# Patient Record
Sex: MALE | Race: WHITE | Employment: FULL TIME | URBAN - METROPOLITAN AREA
[De-identification: names, ages, dates, MRNs, and addresses within clinical notes are randomized per-mention and may not be internally consistent; named-entity substitution may affect disease eponyms.]

---

## 2018-10-03 ENCOUNTER — TELEPHONE (OUTPATIENT)
Dept: ENDOCRINOLOGY | Age: 48
End: 2018-10-03

## 2018-10-03 ENCOUNTER — OFFICE VISIT (OUTPATIENT)
Dept: ENDOCRINOLOGY | Age: 48
End: 2018-10-03
Payer: COMMERCIAL

## 2018-10-03 VITALS
HEART RATE: 89 BPM | DIASTOLIC BLOOD PRESSURE: 68 MMHG | WEIGHT: 186.6 LBS | OXYGEN SATURATION: 99 % | BODY MASS INDEX: 26.71 KG/M2 | HEIGHT: 70 IN | SYSTOLIC BLOOD PRESSURE: 114 MMHG

## 2018-10-03 DIAGNOSIS — E23.0 HYPOPITUITARISM (HCC): Primary | ICD-10-CM

## 2018-10-03 DIAGNOSIS — E29.1 HYPOGONADISM MALE: ICD-10-CM

## 2018-10-03 DIAGNOSIS — E04.1 THYROID NODULE: ICD-10-CM

## 2018-10-03 PROCEDURE — 99215 OFFICE O/P EST HI 40 MIN: CPT | Performed by: INTERNAL MEDICINE

## 2018-10-03 RX ORDER — LISINOPRIL 5 MG/1
1 TABLET ORAL DAILY
Refills: 3 | COMMUNITY
Start: 2018-08-17

## 2018-10-03 RX ORDER — TESTOSTERONE CYPIONATE 200 MG/ML
INJECTION INTRAMUSCULAR
Qty: 10 ML | Refills: 1 | Status: SHIPPED | OUTPATIENT
Start: 2018-10-03 | End: 2019-01-04 | Stop reason: SDUPTHER

## 2018-10-03 RX ORDER — VENLAFAXINE 75 MG/1
75 TABLET ORAL DAILY
COMMUNITY
Start: 2011-01-12

## 2018-10-03 NOTE — PROGRESS NOTES
to send the results to me     ACTH was Elevated in the past -- with normal cortisol Now Acth has normalized--most likely lab issues   No clinical stigma of cortisol excess though   - 24 hr urine free cortisol normal 20 in dec 2014   Recent cortisol 10 and ACTH normal range in th 40 range may 2015     2. Hypogonadism male  Start taking testosterone weekly rather than every 10 days    - testosterone cypionate (DEPOTESTOTERONE CYPIONATE) 200 MG/ML injection; INJECT 0.5 ML (100 MG) INTRAMUSCULARLY EVERY WEEK. Dispense: 10 mL; Refill: 1  I have ordered the following labs on Mr. Melonie Briceno Head Neck Soft Tissue Thyroid; Future  - Prolactin; Future  - Hemoglobin and Hematocrit, Blood; Future  - Testosterone; Future  - T4, Free; Future  - TSH without Reflex; Future  - Hemoglobin and Hematocrit, Blood; Future  - Testosterone; Future    3. Thyroid nodule  Rt lobe thyroid nodule 1.1 cm stable -- June and dec 2014 done at [facility]   June 2015 thyroid uls done at [facility] which shows the size to be 1x 0.9 X 0.9 cm but radiologist reccommended FNA so I discussed the pros and cons of FNAB   He saw  Dr Donald Ross and biopsy was not recommended at the time   He will get repeat thyroid ultrasound now and will call for results as well was advised to follow up with Dr Hong Goss;  Future        No s/s of hypothyroidism   He had a few lower side free thyroid hormones but TSH has been normal which can be normal due to pituitary involovemnt  Will keep an eye for central hypothyroidism   He has no s/s suggestive of hypothyroidism   Check yearly or sooner if pt calls with s/s all of these were discussed with patient in detail         Medulloblastoma s/p craniotomy march 2011 with radiation therapy afterwards   Last available MRI stable he is due to get another MRI in 2018   No comment on pituitary though as per radiology but will call them to comment on pituitary gland emma stalk           Reviewed and/or ordered

## 2018-12-18 LAB
HCT VFR BLD CALC: 42.3 % (ref 41–53)
HEMOGLOBIN: 14.7 G/DL (ref 13.5–17.5)
TESTOSTERONE TOTAL: 911

## 2018-12-19 ENCOUNTER — TELEPHONE (OUTPATIENT)
Dept: ENDOCRINOLOGY | Age: 48
End: 2018-12-19

## 2019-01-04 DIAGNOSIS — E29.1 HYPOGONADISM MALE: ICD-10-CM

## 2019-01-04 DIAGNOSIS — E04.1 THYROID NODULE: ICD-10-CM

## 2019-01-07 RX ORDER — TESTOSTERONE CYPIONATE 200 MG/ML
INJECTION INTRAMUSCULAR
Qty: 10 ML | Refills: 2 | OUTPATIENT
Start: 2019-01-07 | End: 2019-06-23 | Stop reason: SDUPTHER

## 2019-04-05 ENCOUNTER — TELEPHONE (OUTPATIENT)
Dept: ENDOCRINOLOGY | Age: 49
End: 2019-04-05

## 2019-04-05 NOTE — TELEPHONE ENCOUNTER
Pt wife said pharmacy will not fill testosterone or syringes. Spoke with pharmacy and they will get the scripts ready. Pt wife aware.

## 2019-06-23 DIAGNOSIS — E29.1 HYPOGONADISM MALE: ICD-10-CM

## 2019-06-23 DIAGNOSIS — E04.1 THYROID NODULE: ICD-10-CM

## 2019-06-24 DIAGNOSIS — E04.1 THYROID NODULE: ICD-10-CM

## 2019-06-24 DIAGNOSIS — E29.1 HYPOGONADISM MALE: ICD-10-CM

## 2019-06-24 RX ORDER — TESTOSTERONE CYPIONATE 200 MG/ML
INJECTION INTRAMUSCULAR
Qty: 2 ML | Refills: 2 | OUTPATIENT
Start: 2019-06-24 | End: 2019-06-24 | Stop reason: SDUPTHER

## 2019-06-26 RX ORDER — TESTOSTERONE CYPIONATE 200 MG/ML
INJECTION INTRAMUSCULAR
Qty: 2 ML | Refills: 2 | OUTPATIENT
Start: 2019-06-26 | End: 2019-10-03 | Stop reason: SDUPTHER

## 2019-09-26 LAB
PROLACTIN: 16.8
T4 FREE: 0.89
TESTOSTERONE TOTAL: 353
TSH SERPL DL<=0.05 MIU/L-ACNC: 1.26 UIU/ML

## 2019-10-03 ENCOUNTER — OFFICE VISIT (OUTPATIENT)
Dept: ENDOCRINOLOGY | Age: 49
End: 2019-10-03
Payer: COMMERCIAL

## 2019-10-03 VITALS
DIASTOLIC BLOOD PRESSURE: 78 MMHG | BODY MASS INDEX: 27.06 KG/M2 | HEIGHT: 70 IN | OXYGEN SATURATION: 99 % | WEIGHT: 189 LBS | SYSTOLIC BLOOD PRESSURE: 110 MMHG | HEART RATE: 83 BPM

## 2019-10-03 DIAGNOSIS — E04.1 THYROID NODULE: ICD-10-CM

## 2019-10-03 DIAGNOSIS — C71.6 MEDULLOBLASTOMA (HCC): ICD-10-CM

## 2019-10-03 DIAGNOSIS — E29.1 HYPOGONADISM MALE: Primary | ICD-10-CM

## 2019-10-03 DIAGNOSIS — E23.0 PANHYPOPITUITARISM (HCC): Primary | ICD-10-CM

## 2019-10-03 DIAGNOSIS — E29.1 HYPOGONADISM MALE: ICD-10-CM

## 2019-10-03 PROCEDURE — 99214 OFFICE O/P EST MOD 30 MIN: CPT | Performed by: INTERNAL MEDICINE

## 2019-10-03 RX ORDER — TESTOSTERONE CYPIONATE 200 MG/ML
100 INJECTION INTRAMUSCULAR WEEKLY
Qty: 10 ML | Refills: 2 | Status: SHIPPED | OUTPATIENT
Start: 2019-10-03 | End: 2020-06-08 | Stop reason: SDUPTHER

## 2020-06-08 RX ORDER — TESTOSTERONE CYPIONATE 200 MG/ML
100 INJECTION INTRAMUSCULAR WEEKLY
Qty: 10 ML | Refills: 2 | OUTPATIENT
Start: 2020-06-08 | End: 2020-07-06

## 2020-06-08 RX ORDER — TESTOSTERONE CYPIONATE 200 MG/ML
100 INJECTION INTRAMUSCULAR WEEKLY
Qty: 10 ML | Refills: 2 | OUTPATIENT
Start: 2020-06-08 | End: 2021-01-21 | Stop reason: SDUPTHER

## 2020-09-14 ENCOUNTER — TELEPHONE (OUTPATIENT)
Dept: ENDOCRINOLOGY | Age: 50
End: 2020-09-14

## 2020-09-28 NOTE — TELEPHONE ENCOUNTER
Pt's wife calling and stating the lab results for the PSA might be due to him taking Testosterone injections weekly? She didn't know if they would alter the results or if its related?  He takes 0.5 (0.05?)  weekly for [facility]# [phone] Steffany Montelongo

## 2020-09-28 NOTE — TELEPHONE ENCOUNTER
Returned patient's wife's call. She stated he just started a new job and is unsure if he can take off work to come in to the office. Explained we are doing virtual and telephone visits. Patient will call back to schedule appt.

## 2021-01-21 ENCOUNTER — VIRTUAL VISIT (OUTPATIENT)
Dept: ENDOCRINOLOGY | Age: 51
End: 2021-01-21
Payer: COMMERCIAL

## 2021-01-21 DIAGNOSIS — E29.1 HYPOGONADISM MALE: ICD-10-CM

## 2021-01-21 DIAGNOSIS — E04.1 THYROID NODULE: ICD-10-CM

## 2021-01-21 PROCEDURE — 99214 OFFICE O/P EST MOD 30 MIN: CPT | Performed by: INTERNAL MEDICINE

## 2021-01-21 RX ORDER — SYRINGE WITH NEEDLE, 1 ML 25GX5/8"
SYRINGE, EMPTY DISPOSABLE MISCELLANEOUS
Qty: 12 EACH | Refills: 5 | Status: SHIPPED | OUTPATIENT
Start: 2021-01-21 | End: 2022-05-09

## 2021-01-21 RX ORDER — TESTOSTERONE CYPIONATE 200 MG/ML
100 INJECTION INTRAMUSCULAR WEEKLY
Qty: 10 ML | Refills: 2 | Status: SHIPPED | OUTPATIENT
Start: 2021-01-21 | End: 2022-01-27 | Stop reason: SDUPTHER

## 2021-01-21 RX ORDER — LEVOTHYROXINE SODIUM 50 MCG
50 TABLET ORAL
Qty: 30 TABLET | Refills: 3 | Status: SHIPPED | OUTPATIENT
Start: 2021-01-21 | End: 2021-05-25

## 2021-01-21 NOTE — PROGRESS NOTES
SUBJECTIVE:  Sadie Daly is a 52 y.o. male with  history of medulloblastoma status post craniotomy in March 2011 at the [facility] and thirty external beam radiation treatments in June 2011 developed subsequent hypogonadism  Mr. Skinny Isaac was in excellent health until the onset of dizzyness and vertigo in 12/2010. This was accompanied by headache and vomiting, eacerbated when lying down. An MRI showed a lesion in the cerebellum, just to the right of midline concerning for neoplasm. He was evaluated initially by Dr. Zeeshan Llanes and subsequently at the [facility] where biopsy was done which was non-diagnositic. He then underwent a posterior fossa craniotomy at [facility] on 3/16/11 with a gross total resection of the tumor. pathology report showed medulloblastoma. He reports mild decreased libido and fatigue since the Summer of 2011. The fatigue  improved since switched to I/M shots of testo He+ had subtherapeutic levels of testo with even 81 mg of androgel so he was switched to injectables  He denies any orthostatic symptoms, nausea, and abdominal pain. He reports less frequent bowel mowel movements. He continues to have issues with balance since his surgery in 2011  Interim     Jan 2021   patient was noted to have elevated PSA on his blood work in September 2021 which led to reducing the dose of testosterone as per his primary care physician. Patient denies any changes in his symptoms denies any frequent urination or painful urination.       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 No pertinent family history. Social History     Socioeconomic History    Marital status:      Spouse name: None    Number of children: None    Years of education: None    Highest education level: None   Occupational History    None   Social Needs    Financial resource strain: None    Food insecurity     Worry: None     Inability: None    Transportation needs     Medical: None     Non-medical: None   Tobacco Use    Smoking status: Never Smoker    Smokeless tobacco: Never Used   Substance and Sexual Activity    Alcohol use: Yes     Comment: soc    Drug use: No    Sexual activity: None   Lifestyle    Physical activity     Days per week: None     Minutes per session: None    Stress: None   Relationships    Social connections     Talks on phone: None     Gets together: None     Attends Mosque service: None     Active member of club or organization: None     Attends meetings of clubs or organizations: None     Relationship status: None    Intimate partner violence     Fear of current or ex partner: None     Emotionally abused: None     Physically abused: None     Forced sexual activity: None   Other Topics Concern    None   Social History Narrative    None     Current Outpatient Medications   Medication Sig Dispense Refill    Multiple Vitamins-Minerals (MULTIVITAMIN ADULTS PO) Take by mouth      testosterone cypionate (DEPOTESTOTERONE CYPIONATE) 200 MG/ML injection Inject 0.5 mLs into the muscle once a week for 28 days.  10 mL 2    SYRINGE-NEEDLE, DISP, 3 ML (B-D 3CC LUER-CARL SYR 23GX1\") 23G X 1\" 3 ML MISC USE WITH TESTOSTERONE INJ 12 each 5    SYNTHROID 50 MCG tablet Take 1 tablet by mouth every morning (before breakfast) 30 tablet 3    venlafaxine (EFFEXOR) 75 MG tablet Take 75 mg by mouth daily  lisinopril (PRINIVIL;ZESTRIL) 5 MG tablet Take 1 tablet by mouth daily  3     No current facility-administered medications for this visit. No Known Allergies      OBJECTIVE:   There were no vitals taken for this visit. Wt Readings from Last 3 Encounters:   10/03/19 189 lb (85.7 kg)   10/03/18 186 lb 9.6 oz (84.6 kg)         Constitutional: no acute distress, well appearing and well nourished  Psychiatric: oriented to person, place and time, judgement and insight and normal, recent and remote memory intact and mood and affect are normal  Skin: skin and subcutaneous tissue is normal without visible mass,   Head and Face: visual inspection  of head and face revealed no abnormalities  Eyes: visual inspection showed no lid or conjunctival swelling, erythema or discharge, pupils are normal, equal, round  Ears/Nose: external inspection of ears and nose revealed no abnormalities, hearing is grossly normal  Oropharynx/Mouth/Face: lips, tongue and gums appear  normal with no lesions, the voice quality was normal  Neck: neck appears symmetric, with no visible masses,   Pulmonary: no increased work of breathing or signs of respiratory distress,  Musculoskeletal: normal on inspection    Neurological: normal coordination and normal general cortical function      Lab Review:  Lab Results   Component Value Date    TSH 1.260 09/26/2019     Lab Results   Component Value Date    T4FREE 0.89 09/26/2019        ASSESSMENT/PLAN:      ---- Hypopituitarism     Hypopituitarism -- medulloblastoma status post craniotomy in March 2011 and thirty external beam radiation treatments 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 On testosterone 100 mg every other weekly PSA was elevated so he was advised to reduce the frequency of testosterone injections from weekly to every other week injections which brought the PSA back from 3 to 2. Patient is advised to consult with a urologist to have a digital rectal exam to ensure that prostate does not have any anatomical issues which would bar the use of testosterone    Patient is advised to consult with a urologist as he has not elevated PSA, although it did come down I would like him to have a digital rectal exam to evaluate for any prostate structural abnormality he was given the names of urologist in the town he will make that appointment    ---Thyroid nodule  He follows with  and saw him in 2019 and was told that no further work-up is needed for thyroid nodule as per patient.   Since no recent imaging I have given him orders to get thyroid ultrasound done and call back for results  Rt lobe thyroid nodule 1.1 cm stable -- June and dec 2014 done at [facility]   June 2015 thyroid uls done at [facility] which shows the size to be 1x 0.9 X 0.9 cm but radiologist reccommended FNA so I discussed the pros and cons of FNAB   He saw  Dr Dandre Ulloa and biopsy was not recommended at the time   Gave orders to the patient to have a repeat thyroid ultrasound he will get it done at [facility]        Medulloblastoma s/p craniotomy march 2011 with radiation therapy afterwards   Last available MRI stable he is due to get another MRI in 2020    patient was advised by his neurosurgeon follow-up would be in 2 years          Reviewed and/or ordered clinical lab results Yes  Reviewed and/or ordered radiology tests Yes   Reviewed and/or ordered other diagnostic tests Yes  Made a decision to obtain old records Yes  Reviewed and summarized old records Yes Sherlyn  was counseled regarding symptoms of current diagnosis, course and complications of disease if inadequately treated, side effects of medications, diagnosis, treatment options, and prognosis, risks, benefits, complications, and alternatives of treatment, labs, imaging and other studies and treatment targets and goals. He understands instructions and counseling. TELEHEALTH EVALUATION -- Audio/Visual (During DVRWD-22 public health emergency)  Pursuant to the emergency declaration under the [address] [place] [address] waiver authority and the [employer] Act, this Virtual  Visit was conducted, with patient's consent, to reduce the patient's risk of exposure to COVID-19 and provide care for  patient. Services were provided through a video synchronous discussion virtually to substitute for in-person clinic visit. Patient's location : home     Patient provided verbal consent to use the video visit. Total time spent : Reviewing the chart, conducting an interview, performing a limited exam by video and educating the patient on my assessment plan. Return in about 1 year (around 1/21/2022). Please note that some or all of this report was generated using voice recognition software. Please notify me in case of any questions about the content of this document, as some errors in transcription may have occurred .

## 2021-01-21 NOTE — Clinical Note
Please email patient the lab orders as well as the order for thyroid ultrasound at [email or web address] patient needs a follow-up in 1 year

## 2021-01-22 NOTE — PROGRESS NOTES
Labs orders and US order emailed to patient per patient request. Patient to call office back to schedule a f/u in 1 year.

## 2021-01-26 ENCOUNTER — TELEPHONE (OUTPATIENT)
Dept: ENDOCRINOLOGY | Age: 51
End: 2021-01-26

## 2021-01-26 NOTE — TELEPHONE ENCOUNTER
[address] [facility] calling regarding the medication Synthroid. The name brand medication is very expensive and the pharmacy would like to know if they can give the patient the generic one. Call and advise.   CB #[phone]

## 2021-01-26 NOTE — TELEPHONE ENCOUNTER
Returned pharmacy's call. They stated the patient is not wanting to pay for the Brand Synthroid and he would like to be switched to generic. Gave verbal okay to switch to generic.

## 2021-02-19 ENCOUNTER — TELEPHONE (OUTPATIENT)
Dept: ENDOCRINOLOGY | Age: 51
End: 2021-02-19

## 2021-02-19 DIAGNOSIS — E04.1 THYROID NODULE: Primary | ICD-10-CM

## 2021-04-07 ENCOUNTER — TELEPHONE (OUTPATIENT)
Dept: ENDOCRINOLOGY | Age: 51
End: 2021-04-07

## 2021-04-07 NOTE — TELEPHONE ENCOUNTER
Please advise patient I have reviewed his testosterone level which is within normal limits, PSA is still 2.2, free T4 was within normal limits so thyroid hormone replacement dose appears to be good. I hope he has already consulted with a urologist for his elevated PSA otherwise he can also share these labs with his primary care physician.

## 2021-04-07 NOTE — TELEPHONE ENCOUNTER
Fax from [facility]/ labs from 4-3-21. Pt has not future scheduled appt's.  He just comes in once a yr

## 2022-01-14 ENCOUNTER — TELEPHONE (OUTPATIENT)
Dept: ENDOCRINOLOGY | Age: 52
End: 2022-01-14

## 2022-01-27 ENCOUNTER — OFFICE VISIT (OUTPATIENT)
Dept: ENDOCRINOLOGY | Age: 52
End: 2022-01-27
Payer: COMMERCIAL

## 2022-01-27 VITALS
SYSTOLIC BLOOD PRESSURE: 111 MMHG | RESPIRATION RATE: 16 BRPM | HEART RATE: 84 BPM | WEIGHT: 188 LBS | HEIGHT: 69 IN | DIASTOLIC BLOOD PRESSURE: 72 MMHG | BODY MASS INDEX: 27.85 KG/M2

## 2022-01-27 DIAGNOSIS — E04.1 THYROID NODULE: ICD-10-CM

## 2022-01-27 DIAGNOSIS — E29.1 HYPOGONADISM MALE: ICD-10-CM

## 2022-01-27 PROCEDURE — 99214 OFFICE O/P EST MOD 30 MIN: CPT | Performed by: INTERNAL MEDICINE

## 2022-01-27 RX ORDER — TESTOSTERONE CYPIONATE 200 MG/ML
100 INJECTION INTRAMUSCULAR
Qty: 2 ML | Refills: 3 | Status: SHIPPED | OUTPATIENT
Start: 2022-01-27 | End: 2022-08-29

## 2022-01-27 RX ORDER — LEVOTHYROXINE SODIUM 0.07 MG/1
TABLET ORAL
Qty: 90 TABLET | Refills: 3 | Status: SHIPPED | OUTPATIENT
Start: 2022-01-27

## 2022-01-27 NOTE — PROGRESS NOTES
SUBJECTIVE:  Sherice Jung is a 54 y.o. male with  history of medulloblastoma status post craniotomy in March 2011 at the [facility] and thirty external beam radiation treatments in June 2011 developed subsequent hypogonadism  Mr. Babs Galindo was in excellent health until the onset of dizzyness and vertigo in 12/2010. This was accompanied by headache and vomiting, eacerbated when lying down. An MRI showed a lesion in the cerebellum, just to the right of midline concerning for neoplasm. He was evaluated initially by Dr. Karyn Louis and subsequently at the [facility] where biopsy was done which was non-diagnositic. He then underwent a posterior fossa craniotomy at [facility] on 3/16/11 with a gross total resection of the tumor. pathology report showed medulloblastoma. He reports mild decreased libido and fatigue since the Summer of 2011. The fatigue  improved since switched to I/M shots of testo He+ had subtherapeutic levels of testo with even 81 mg of androgel so he was switched to injectables  He denies any orthostatic symptoms, nausea, and abdominal pain. He reports less frequent bowel mowel movements. He continues to have issues with balance since his surgery in 2011  Interim     Jan 2022   patient was noted to have elevated PSA on his blood work in September 2021 which led to reducing the dose of testosterone as per his primary care physician   he is currently taking 100 mg every other week. His PSA dropped down to 1.6 he now follows with urology. Patient denies any changes in his symptoms denies any frequent urination or painful urination.       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 No pertinent family history. Social History     Socioeconomic History    Marital status:      Spouse name: None    Number of children: None    Years of education: None    Highest education level: None   Occupational History    None   Tobacco Use    Smoking status: Never Smoker    Smokeless tobacco: Never Used   Vaping Use    Vaping Use: Never used   Substance and Sexual Activity    Alcohol use: Yes     Comment: soc    Drug use: No    Sexual activity: None   Other Topics Concern    None   Social History Narrative    None     Social Determinants of Health     Financial Resource Strain:     Difficulty of Paying Living Expenses: Not on file   Food Insecurity:     Worried About Running Out of Food in the Last Year: Not on file    Edmund of Food in the Last Year: Not on file   Transportation Needs:     Lack of Transportation (Medical): Not on file    Lack of Transportation (Non-Medical):  Not on file   Physical Activity:     Days of Exercise per Week: Not on file    Minutes of Exercise per Session: Not on file   Stress:     Feeling of Stress : Not on file   Social Connections:     Frequency of Communication with Friends and Family: Not on file    Frequency of Social Gatherings with Friends and Family: Not on file    Attends Voodoo Services: Not on file    Active Member of Clubs or Organizations: Not on file    Attends Club or Organization Meetings: Not on file    Marital Status: Not on file   Intimate Partner Violence:     Fear of Current or Ex-Partner: Not on file    Emotionally Abused: Not on file    Physically Abused: Not on file    Sexually Abused: Not on file   Housing Stability:     Unable to Pay for Housing in the Last Year: Not on file    Number of Places Lived in the Last Year: Not on file    Unstable Housing in the Last Year: Not on file     Current Outpatient Medications   Medication Sig Dispense Refill    levothyroxine (SYNTHROID) 75 MCG tablet Take 75 mcg daily 90 tablet 3    testosterone cypionate (DEPOTESTOTERONE CYPIONATE) 200 MG/ML injection Inject 0.5 mLs into the muscle every 14 days for 28 days. 2 mL 3    SYRINGE-NEEDLE, DISP, 3 ML (B-D 3CC LUER-CARL SYR 23GX1\") 23G X 1\" 3 ML MISC USE WITH TESTOSTERONE INJ 12 each 5    venlafaxine (EFFEXOR) 75 MG tablet Take 75 mg by mouth daily      lisinopril (PRINIVIL;ZESTRIL) 5 MG tablet Take 1 tablet by mouth daily  3     No current facility-administered medications for this visit.      No Known Allergies      OBJECTIVE:   /72   Pulse 84   Resp 16   Ht 5' 9\" (1.753 m)   Wt 188 lb (85.3 kg)   BMI 27.76 kg/m²   Wt Readings from Last 3 Encounters:   01/27/22 188 lb (85.3 kg)   10/03/19 189 lb (85.7 kg)   10/03/18 186 lb 9.6 oz (84.6 kg)         Constitutional: no acute distress, well appearing and well nourished  Psychiatric: oriented to person, place and time, judgement and insight and normal, recent and remote memory intact and mood and affect are normal  Skin: skin and subcutaneous tissue is normal without visible mass,   Head and Face: visual inspection  of head and face revealed no abnormalities  Eyes: visual inspection showed no lid or conjunctival swelling, erythema or discharge, pupils are normal, equal, round  Ears/Nose: external inspection of ears and nose revealed no abnormalities, hearing is grossly normal  Oropharynx/Mouth/Face: lips, tongue and gums appear  normal with no lesions, the voice quality was normal  Neck: neck appears symmetric, with no visible masses,   Pulmonary: no increased work of breathing or signs of respiratory distress,  Musculoskeletal: normal on inspection    Neurological: normal coordination and normal general cortical function      Lab Review:  Lab Results Component Value Date    TSH 1.260 09/26/2019     Lab Results   Component Value Date    T4FREE 0.89 09/26/2019        ASSESSMENT/PLAN:      ---- Hypopituitarism     Hypopituitarism -- medulloblastoma status post craniotomy in March 2011 and thirty external beam radiation treatments 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 He tried androgel with no improvement in his level ,He was switched to Testosterone Cypionate and he noted improvement in his symptoms he was taking 100 mg IM /every 7 days but in November 2020 he was noted to have an elevated PSA of 3 which had increased from a PSA value of 1.9. At that point his primary care physician asked the patient to reduce the frequency of testosterone injections 200 mg every other week and his repeat PSA dropped down to a level of 2. He had a normal PSA of 2  in 2018>>1.9>>1.8 in 2019  --Free T4 was noted to be 0.81 in September 2020, started on 50 mcg of levothyroxine, patient did not notice any significant improvement in his symptoms he did have a follow-up blood work done which showed improved T4 of 1.6 but most recent T4 is down again at 1.0 due to his complaints of fatigue I will increase the dose to 75 mcg daily  He is advised to repeat free T4 in 2 months  And will repeat free T4 in 2 months after starting the therapy. They want to keep yearly apts and take responsibility of calling me to go over labs and call with S/S in between if something changes. MRI 10/2020 done at [address] was stable, patient did discuss the results with his neurosurgeon. Side effects of all prescribed medications were discussed at length with the patient. If any SEs develop, patient was advised to stop the medication and call our office. Patient expresses understanding and agrees with the plan.  No further questions or concerns expressed   He had a cosyntropin stim test in march 2012 with cortisol level upto 20 --am cortsiol was 13 ACTH was 61     PROLACTIN has been mildly elevated but has stayed stable no S/s of hyperprolactinemia   he gets yearly MRI with no significant change in his anatomy he had one done in 2020 and follows with Dr. Damaris Polanco    - 24 hr urine free cortisol normal 20 in dec 2014   --cortisol 10 and ACTH normal range in th 40 range may 2015 No s/s of hypothyroidism   He had a few lower side free thyroid hormones but TSH has been normal which can be normal due to pituitary involovemnt  Will keep an eye for central hypothyroidism   He has no s/s suggestive of hypothyroidism   Check yearly or sooner if pt calls with s/s all of these were discussed with patient in detail       -- Hypogonadism male  On testosterone 100 mg every other weekly PSA was elevated so he was advised to reduce the frequency of testosterone injections from weekly to every other week injections which brought the PSA back from 3 to 2. And now 1.6 in December 2021. PSA 1.6 pt does follow with urology     ---Thyroid nodule  Last thyroid ultrasound was in January 2021 which showed stable nodule with a 9 mm nodule in the right lobe. He will continue with continued surveillance for now he was given an order to get a thyroid ultrasound done now. He saw Bryan Jade 2019 and was told that no further work-up is needed for thyroid nodule as per patient.   Since no recent imaging I have given him orders to get thyroid ultrasound done and call back for results  Rt lobe thyroid nodule 1.1 cm stable -- June and dec 2014 done at [facility] [place] [place]   June 2015 thyroid uls done at [facility] which shows the size to be 1x 0.9 X 0.9 cm but radiologist reccommended FNA so I discussed the pros and cons of FNAB   He saw  Dr Rafat Kramer and biopsy was not recommended at the time   Gave orders to the patient to have a repeat thyroid ultrasound he will get it done at [facility]      Medulloblastoma s/p craniotomy march 2011 with radiation therapy afterwards   Last available MRI stable he is due to get another MRI in 2020    patient was advised by his neurosurgeon follow-up would be in 2 years          Reviewed and/or ordered clinical lab results Yes  Reviewed and/or ordered radiology tests Yes   Reviewed and/or ordered other diagnostic tests Yes  Made a decision to obtain old records Yes  Reviewed and summarized old records Yes      Jordana Rivas was counseled regarding symptoms of current diagnosis, course and complications of disease if inadequately treated, side effects of medications, diagnosis, treatment options, and prognosis, risks, benefits, complications, and alternatives of treatment, labs, imaging and other studies and treatment targets and goals. He understands instructions and counseling. No follow-ups on file. Please note that some or all of this report was generated using voice recognition software. Please notify me in case of any questions about the content of this document, as some errors in transcription may have occurred .

## 2022-03-08 ENCOUNTER — TELEPHONE (OUTPATIENT)
Dept: ENDOCRINOLOGY | Age: 52
End: 2022-03-08

## 2022-03-08 NOTE — TELEPHONE ENCOUNTER
Please advise patient I reviewed the results of his thyroid ultrasound and he continues to have stable size of the thyroid nodule 1.1 cm in his right lobe.   Will repeat imaging in 1 to 2 years

## 2022-03-14 ENCOUNTER — TELEPHONE (OUTPATIENT)
Dept: ENDOCRINOLOGY | Age: 52
End: 2022-03-14

## 2022-04-13 ENCOUNTER — TELEPHONE (OUTPATIENT)
Dept: ENDOCRINOLOGY | Age: 52
End: 2022-04-13

## 2022-04-13 NOTE — TELEPHONE ENCOUNTER
Please advise patient I reviewed his labs and his thyroid hormone level seems to be adequate he should stay on the current dose of thyroid medication. Testosterone was slightly low but if his energy level is fine then we can leave him at this dose as previously he had elevated PSA so I do not want to increase the testosterone to hurt his PSA level again.

## 2022-05-08 DIAGNOSIS — E29.1 HYPOGONADISM MALE: ICD-10-CM

## 2022-05-09 ENCOUNTER — TELEPHONE (OUTPATIENT)
Dept: ENDOCRINOLOGY | Age: 52
End: 2022-05-09

## 2022-05-09 RX ORDER — SYRINGE WITH NEEDLE, 1 ML 25GX5/8"
SYRINGE, EMPTY DISPOSABLE MISCELLANEOUS
Qty: 12 EACH | Refills: 5 | Status: SHIPPED | OUTPATIENT
Start: 2022-05-09

## 2022-05-09 NOTE — TELEPHONE ENCOUNTER
Submitted PA for BD Luer-Carl Syringe 23G X 1\"3 ML  Key: [id number]  Via CMM STATUS: LUER-CARL SYRINGE-NEEDLE 23GX1 Henrine Box is not covered for this Member. For formulary alternatives, please view the Standard or [employer] Formulary, whichever this Member is covered by, at [email or web address] or call us at [phone].

## 2022-08-28 DIAGNOSIS — E29.1 HYPOGONADISM MALE: ICD-10-CM

## 2022-08-28 DIAGNOSIS — E04.1 THYROID NODULE: ICD-10-CM

## 2022-08-29 DIAGNOSIS — E29.1 HYPOGONADISM MALE: ICD-10-CM

## 2022-08-29 DIAGNOSIS — E04.1 THYROID NODULE: ICD-10-CM

## 2022-08-29 RX ORDER — TESTOSTERONE CYPIONATE 200 MG/ML
INJECTION INTRAMUSCULAR
Qty: 2 ML | Refills: 5 | Status: SHIPPED | OUTPATIENT
Start: 2022-08-29 | End: 2022-08-29 | Stop reason: SDUPTHER

## 2022-08-29 RX ORDER — TESTOSTERONE CYPIONATE 200 MG/ML
INJECTION INTRAMUSCULAR
Qty: 2 ML | Refills: 5 | Status: SHIPPED | OUTPATIENT
Start: 2022-08-29 | End: 2023-02-28

## 2023-01-26 ENCOUNTER — TELEPHONE (OUTPATIENT)
Dept: ENDOCRINOLOGY | Age: 53
End: 2023-01-26

## 2023-01-26 ENCOUNTER — OFFICE VISIT (OUTPATIENT)
Dept: ENDOCRINOLOGY | Age: 53
End: 2023-01-26
Payer: COMMERCIAL

## 2023-01-26 VITALS
HEART RATE: 70 BPM | BODY MASS INDEX: 27.85 KG/M2 | HEIGHT: 69 IN | SYSTOLIC BLOOD PRESSURE: 115 MMHG | RESPIRATION RATE: 16 BRPM | DIASTOLIC BLOOD PRESSURE: 73 MMHG | WEIGHT: 188 LBS

## 2023-01-26 DIAGNOSIS — E29.1 HYPOGONADISM MALE: ICD-10-CM

## 2023-01-26 DIAGNOSIS — E04.1 THYROID NODULE: ICD-10-CM

## 2023-01-26 DIAGNOSIS — E23.0 PANHYPOPITUITARISM (HCC): Primary | ICD-10-CM

## 2023-01-26 PROCEDURE — 99214 OFFICE O/P EST MOD 30 MIN: CPT | Performed by: INTERNAL MEDICINE

## 2023-01-26 RX ORDER — LEVOTHYROXINE SODIUM 88 MCG
88 TABLET ORAL
Qty: 30 TABLET | Refills: 3 | Status: SHIPPED | OUTPATIENT
Start: 2023-01-26

## 2023-01-26 RX ORDER — SYRINGE WITH NEEDLE, 1 ML 25GX5/8"
SYRINGE, EMPTY DISPOSABLE MISCELLANEOUS
Qty: 12 EACH | Refills: 5 | Status: CANCELLED | OUTPATIENT
Start: 2023-01-26

## 2023-01-26 RX ORDER — TESTOSTERONE CYPIONATE 200 MG/ML
INJECTION INTRAMUSCULAR
Qty: 2 ML | Refills: 5 | Status: CANCELLED | OUTPATIENT
Start: 2023-01-26 | End: 2023-07-28

## 2023-01-26 RX ORDER — LEVOTHYROXINE SODIUM 0.07 MG/1
TABLET ORAL
Qty: 90 TABLET | Refills: 3 | Status: CANCELLED | OUTPATIENT
Start: 2023-01-26

## 2023-01-26 RX ORDER — TESTOSTERONE ENANTHATE 100 MG/.5ML
INJECTION SUBCUTANEOUS
Qty: 4 ADJUSTABLE DOSE PRE-FILLED PEN SYRINGE | Refills: 3 | Status: SHIPPED | OUTPATIENT
Start: 2023-01-26

## 2023-01-26 NOTE — PROGRESS NOTES
SUBJECTIVE:  Luis Eduardo Hennessy is a 50 y.o. male with  history of medulloblastoma status post craniotomy in March 2011 at the [facility] and thirty external beam radiation treatments in June 2011 developed subsequent hypogonadism  Mr. Jinny Donald was in excellent health until the onset of dizzyness and vertigo in 12/2010. This was accompanied by headache and vomiting, eacerbated when lying down. An MRI showed a lesion in the cerebellum, just to the right of midline concerning for neoplasm. He was evaluated initially by Dr. Jesusita Guillen and subsequently at the [facility] where biopsy was done which was non-diagnositic. He then underwent a posterior fossa craniotomy at [facility] on 3/16/11 with a gross total resection of the tumor. pathology report showed medulloblastoma. He reports mild decreased libido and fatigue since the Summer of 2011. The fatigue  improved since switched to I/M shots of testo He+ had subtherapeutic levels of testo with even 81 mg of androgel so he was switched to injectables  He denies any orthostatic symptoms, nausea, and abdominal pain. He reports less frequent bowel mowel movements. --He continues to have issues with balance since his surgery in 2011   ---patient was noted to have elevated PSA on his blood work in September 2021 which led to reducing the dose of testosterone as per his primary care physician   he is currently taking 100 mg every other week. His PSA dropped down to 1.6 he now follows with urology. Interim   Patient denies any changes in his symptoms denies any frequent urination or painful urination. ---does note some worsening of fatigue over the years.       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 No pertinent family history. Social History     Socioeconomic History    Marital status:      Spouse name: None    Number of children: None    Years of education: None    Highest education level: None   Tobacco Use    Smoking status: Never    Smokeless tobacco: Never   Vaping Use    Vaping Use: Never used   Substance and Sexual Activity    Alcohol use: Yes     Comment: soc    Drug use: No     Current Outpatient Medications   Medication Sig Dispense Refill    Testosterone Enanthate (XYOSTED) 100 MG/0.5ML SOAJ Take 100 mg every 2 week 4 Adjustable Dose Pre-filled Pen Syringe 3    SYNTHROID 88 MCG tablet Take 1 tablet by mouth every morning (before breakfast) 30 tablet 3    testosterone cypionate (DEPOTESTOTERONE CYPIONATE) 200 MG/ML injection INJECT INTRAMUSCULARLY 0.5 MLS EVERY 14 DAYS (SINGLE USE VIALS) 2 mL 5    SYRINGE-NEEDLE, DISP, 3 ML (B-D 3CC LUER-CARL SYR 23GX1\") 23G X 1\" 3 ML MISC USE WITH TESTOSTERONE INJECTION 12 each 5    venlafaxine (EFFEXOR) 75 MG tablet Take 75 mg by mouth daily      lisinopril (PRINIVIL;ZESTRIL) 5 MG tablet Take 1 tablet by mouth daily  3     No current facility-administered medications for this visit. No Known Allergies  ROS  I have reviewed the review of system questionnaire filled by the patient .   Patient was advised to contact PCP for non endocrine signs and symptoms       OBJECTIVE:   /73   Pulse 70   Resp 16   Ht 5' 9\" (1.753 m)   Wt 188 lb (85.3 kg)   BMI 27.76 kg/m²   Wt Readings from Last 3 Encounters:   01/26/23 188 lb (85.3 kg)   01/27/22 188 lb (85.3 kg)   10/03/19 189 lb (85.7 kg)     Constitutional: no acute distress, well appearing, well nourished  Psychiatric: oriented to person, place and time, judgement, insight and normal, recent and remote memory and intact and mood, affect are normal  Skin: skin and subcutaneous tissue is normal without mass,   Head and Face: examination of head and face revealed no abnormalities  Eyes: no lid or conjunctival swelling, no erythema or discharge, pupils are normal,   Ears/Nose: external inspection of ears and nose revealed no abnormalities, hearing is grossly normal  Oropharynx/Mouth/Face: lips, tongue and gums are normal with no lesions, the voice quality was normal  Neck: neck is supple and symmetric, with midline trachea and no masses, thyroid is normal    Pulmonary: no increased work of breathing or signs of respiratory distress, lungs are clear to auscultation  Cardiovascular: normal heart rate and rhythm, normal S1 and S2,   Musculoskeletal: normal gait and station,   Neurological: normal coordination, normal general cortical function        Lab Review:  Lab Results   Component Value Date/Time    TSH 1.260 09/26/2019 12:00 AM     Lab Results   Component Value Date/Time    T4FREE 0.89 09/26/2019 12:00 AM        ASSESSMENT/PLAN:      ---- Hypopituitarism     Hypopituitarism -- medulloblastoma status post craniotomy in March 2011 and thirty external beam radiation treatments 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 He tried androgel with no improvement in his level   ---He was switched to Testosterone Cypionate and he noted improvement in his symptoms he was taking 100 mg IM /every 7 days but in November 2020 he was noted to have an elevated PSA of 3 which had increased from a PSA value of 1.9.     At that point his primary care physician asked the patient to reduce the frequency of testosterone injections 100 mg every other week and his repeat PSA dropped down to a level of 2.>>1.9 in sept 2022     --Free T4 was noted to be 0.81 in September 2020, started on 50 mcg of levothyroxine, patient did not notice any significant improvement in his symptoms he did have a follow-up blood work done which showed improved T4 of 1.6 but most recent T4 is down again at 1.0 due to his complaints of fatigue I will increase the dose to 88  mcg daily  He is advised to repeat free T4 in 2 months along with a testosterone level suppressed specially if he switches to testosterone enanthate    MRI 10/2020 done at [facility] was stable, patient did discuss the results with his neurosurgeon.  Side effects of all prescribed medications were discussed at length with the patient. If any SEs develop, patient was advised to stop the medication and call our office.   Patient expresses understanding and agrees with the plan. No further questions or concerns expressed.    He had a cosyntropin stim test in march 2012 with cortisol level upto 20 --am cortsiol was 13 ACTH was 60     PROLACTIN has been mildly elevated but has stayed stable no S/s of hyperprolactinemia   he gets yearly MRI with no significant change in his anatomy , follows with Dr. Ochoa    - 24 hr urine free cortisol normal 20 in dec 2014   --cortisol 10 and ACTH normal range in th 40 range may 2015 No s/s of hypothyroidism   He had a few lower side free thyroid hormones but TSH has been normal which can be normal due to pituitary involovemnt  Will keep an eye for central hypothyroidism   He has no s/s suggestive of hypothyroidism   Check yearly or sooner if pt calls with s/s all of these were discussed with patient in detail       -- Hypogonadism male  On testosterone 100 mg every other weekly PSA was elevated so he was advised to reduce the frequency of testosterone injections from weekly to every other week injections which brought the PSA back from 3 to 2.   He gets yearly PSA done at his primary care physician and was advised to follow-up with him for that level.  Will switch to Xyosted for the ease of injections if approved he will get testosterone level drawn 2 to 3  months after the change. ---Thyroid nodule  Last thyroid ultrasound was in March 2022 which showed stable nodule   --Patient was advised to get ultrasound done in March 2023 as well as a yearly follow-up. He saw Jose Smith 2019 and was told that no further work-up is needed for thyroid nodule as per patient. Rt lobe thyroid nodule 1.1 cm stable -- June and dec 2014 done at [facility]   June 2015 thyroid uls done at [facility] which shows the size to be 1x 0.9 X 0.9 cm but radiologist reccommended FNA so I discussed the pros and cons of FNAB   He saw  Dr Florentino Casillas and biopsy was not recommended at the time   Gave orders to the patient to have a repeat thyroid ultrasound he will get it done at [facility]      Medulloblastoma s/p craniotomy march 2011 with radiation therapy afterwards   Last available MRI stable he is due to get another MRI in 2020    patient was advised by his neurosurgeon follow-up would be in 2 years          Reviewed and/or ordered clinical lab results Yes  Reviewed and/or ordered radiology tests Yes   Reviewed and/or ordered other diagnostic tests Yes  Made a decision to obtain old records Yes  Reviewed and summarized old records Yes      Joey Suarez was counseled regarding symptoms of current diagnosis, course and complications of disease if inadequately treated, side effects of medications, diagnosis, treatment options, and prognosis, risks, benefits, complications, and alternatives of treatment, labs, imaging and other studies and treatment targets and goals. He understands instructions and counseling. Return in about 1 year (around 1/26/2024). Please note that some or all of this report was generated using voice recognition software. Please notify me in case of any questions about the content of this document, as some errors in transcription may have occurred .

## 2023-01-28 ENCOUNTER — PATIENT MESSAGE (OUTPATIENT)
Dept: ENDOCRINOLOGY | Age: 53
End: 2023-01-28

## 2023-01-28 DIAGNOSIS — E23.0 PANHYPOPITUITARISM (HCC): Primary | ICD-10-CM

## 2023-01-28 DIAGNOSIS — E04.1 THYROID NODULE: ICD-10-CM

## 2023-01-30 RX ORDER — LEVOTHYROXINE SODIUM 0.07 MG/1
TABLET ORAL
Qty: 90 TABLET | Refills: 3 | OUTPATIENT
Start: 2023-01-30

## 2023-01-30 RX ORDER — LEVOTHYROXINE SODIUM 88 UG/1
88 TABLET ORAL DAILY
Qty: 90 TABLET | Refills: 3 | Status: SHIPPED | OUTPATIENT
Start: 2023-01-30

## 2023-01-30 NOTE — TELEPHONE ENCOUNTER
From: Landy Scheuermann  To: Dr. Marcia Matta  Sent: 1/28/2023 2:58 PM EST  Subject: synthroid     Please accept the refill request for the 75 mg prescription of Levothyroxine. The cost of the 88mg for 30 days was three times the cost of a 90 day supply of the 75mg prescription. Therefore, I am staying with the 75 mg. Levothyroxine.     Thanks,    Stephany Echeverria

## 2023-02-14 ENCOUNTER — TELEPHONE (OUTPATIENT)
Dept: ENDOCRINOLOGY | Age: 53
End: 2023-02-14

## 2023-02-15 NOTE — TELEPHONE ENCOUNTER
Please advise patient thyroid ultrasound shows that the thyroid nodule which we have been following is slightly decreased in size I would recommend 1 to 2 years follow-up thyroid ultrasound

## 2023-03-28 DIAGNOSIS — E29.1 HYPOGONADISM MALE: ICD-10-CM

## 2023-03-28 DIAGNOSIS — E04.1 THYROID NODULE: ICD-10-CM

## 2023-03-29 RX ORDER — TESTOSTERONE CYPIONATE 200 MG/ML
INJECTION, SOLUTION INTRAMUSCULAR
Qty: 2 ML | OUTPATIENT
Start: 2023-03-29

## 2023-03-30 ENCOUNTER — TELEPHONE (OUTPATIENT)
Dept: ENDOCRINOLOGY | Age: 53
End: 2023-03-30

## 2023-03-30 DIAGNOSIS — E04.1 THYROID NODULE: ICD-10-CM

## 2023-03-30 DIAGNOSIS — E29.1 HYPOGONADISM MALE: ICD-10-CM

## 2023-03-30 RX ORDER — TESTOSTERONE CYPIONATE 200 MG/ML
INJECTION, SOLUTION INTRAMUSCULAR
Qty: 2 ML | Refills: 5 | Status: SHIPPED | OUTPATIENT
Start: 2023-03-30 | End: 2023-09-29

## 2023-03-30 NOTE — TELEPHONE ENCOUNTER
Pt's wife calling and asking why the rx for Testosterone was denied. She states they never picked up the rx for [facility] because it was going to cost $500 so they went back to the regular Testosterone Cypionate.  She would like the rx for the Testosterone Cyp sent to [facility] on [address]    CB# [phone]  Maria-wife

## 2023-05-10 ENCOUNTER — TELEPHONE (OUTPATIENT)
Dept: ENDOCRINOLOGY | Age: 53
End: 2023-05-10

## 2023-05-10 NOTE — TELEPHONE ENCOUNTER
Please advise patient his thyroid hormone levels are in a good range he should continue with the current dose of thyroid medication. His testosterone level is on the lower end I would like to know what dose he is currently taking if he symptomatically feels good at this level and his energy level is good we can leave him alone at the current dose of testosterone.

## 2023-07-02 DIAGNOSIS — E29.1 HYPOGONADISM MALE: ICD-10-CM

## 2023-07-05 RX ORDER — SYRINGE WITH NEEDLE, 1 ML 25GX5/8"
SYRINGE, EMPTY DISPOSABLE MISCELLANEOUS
Qty: 6 EACH | Refills: 5 | Status: SHIPPED | OUTPATIENT
Start: 2023-07-05

## 2023-11-06 DIAGNOSIS — E29.1 HYPOGONADISM MALE: ICD-10-CM

## 2023-11-06 DIAGNOSIS — E04.1 THYROID NODULE: ICD-10-CM

## 2023-11-07 RX ORDER — TESTOSTERONE CYPIONATE 200 MG/ML
INJECTION, SOLUTION INTRAMUSCULAR
Qty: 2 ML | Refills: 2 | Status: SHIPPED | OUTPATIENT
Start: 2023-11-07 | End: 2024-02-07

## 2024-01-25 ENCOUNTER — OFFICE VISIT (OUTPATIENT)
Dept: ENDOCRINOLOGY | Age: 54
End: 2024-01-25

## 2024-01-25 VITALS
HEIGHT: 69 IN | BODY MASS INDEX: 28.44 KG/M2 | SYSTOLIC BLOOD PRESSURE: 116 MMHG | WEIGHT: 192 LBS | HEART RATE: 86 BPM | DIASTOLIC BLOOD PRESSURE: 81 MMHG

## 2024-01-25 DIAGNOSIS — E04.1 THYROID NODULE: ICD-10-CM

## 2024-01-25 DIAGNOSIS — E23.0 PANHYPOPITUITARISM (HCC): Primary | ICD-10-CM

## 2024-01-25 DIAGNOSIS — E29.1 HYPOGONADISM MALE: ICD-10-CM

## 2024-01-25 RX ORDER — TESTOSTERONE CYPIONATE 200 MG/ML
INJECTION, SOLUTION INTRAMUSCULAR
Qty: 2 ML | Refills: 2 | Status: SHIPPED | OUTPATIENT
Start: 2024-01-25 | End: 2024-04-26

## 2024-01-25 RX ORDER — LEVOTHYROXINE SODIUM 88 UG/1
88 TABLET ORAL DAILY
Qty: 90 TABLET | Refills: 3 | Status: SHIPPED | OUTPATIENT
Start: 2024-01-25

## 2024-01-25 NOTE — PROGRESS NOTES
SUBJECTIVE:  Eric Maier is a 53 y.o. male with  history of medulloblastoma status post craniotomy in March 2011 at the [facility] and thirty external beam radiation treatments in June 2011 developed subsequent hypogonadism  Mr. Maier was in excellent health until the onset of dizzyness and vertigo in 12/2010. This was accompanied by headache and vomiting, eacerbated when lying down. An MRI showed a lesion in the cerebellum, just to the right of midline concerning for neoplasm. He was evaluated initially by Dr. Arellano and subsequently at the [facility] where biopsy was done which was non-diagnositic. He then underwent a posterior fossa craniotomy at [facility] on 3/16/11 with a gross total resection of the tumor. pathology report showed medulloblastoma. He reports mild decreased libido and fatigue since the Summer of 2011.   The fatigue  improved since switched to I/M shots of testo He+ had subtherapeutic levels of testo with even 81 mg of androgel so he was switched to injectables  He denies any orthostatic symptoms, nausea, and abdominal pain. He reports less frequent bowel mowel movements.    --He continues to have issues with balance since his surgery in 2011   ---patient was noted to have elevated PSA on his blood work in September 2021 which led to reducing the dose of testosterone as per his primary care physician   he is currently taking 100 mg every other week.  His PSA dropped down to 1.6 did consult with urology at that time.    Interim   No new complaints  Has been taking his levothyroxine regularly and testosterone 100 mg every 14 days.  The dose was reduced after he had elevated PSA in 2021      Past Medical History:   Diagnosis Date    Hearing loss 2019    R ear     Hypogonadism in male 10/3/2018    Hypopituitarism (HCC) 10/3/2018    Medulloblastoma (HCC)      Patient Active Problem List    Diagnosis Date Noted    Medulloblastoma (HCC) 10/03/2019    Thyroid nodule 10/03/2019

## 2024-01-29 ENCOUNTER — TELEPHONE (OUTPATIENT)
Dept: ENDOCRINOLOGY | Age: 54
End: 2024-01-29

## 2024-01-29 NOTE — TELEPHONE ENCOUNTER
Please advise patient his lab results for free T4 and hemoglobin hematocrit look good stay on the current dose of thyroid medication and please schedule follow-up with urology for PSA trend and prostate issues

## 2024-03-18 ENCOUNTER — TELEPHONE (OUTPATIENT)
Dept: ENDOCRINOLOGY | Age: 54
End: 2024-03-18

## 2024-03-18 NOTE — TELEPHONE ENCOUNTER
Submitted PA for Testosterone Cypionate  Via [facility] Key: [id number]    STATUS: Approved today  PA Case: [id number], Status: Approved, Coverage Starts on: 3/18/2024 12:00:00 AM, Coverage Ends on: 3/18/2025

## 2024-07-25 DIAGNOSIS — E29.1 HYPOGONADISM MALE: ICD-10-CM

## 2024-07-25 DIAGNOSIS — E04.1 THYROID NODULE: ICD-10-CM

## 2024-07-25 RX ORDER — TESTOSTERONE CYPIONATE 200 MG/ML
INJECTION, SOLUTION INTRAMUSCULAR
Qty: 4 ML | Refills: 5 | Status: SHIPPED | OUTPATIENT
Start: 2024-07-25 | End: 2025-01-25

## 2024-08-25 DIAGNOSIS — E29.1 HYPOGONADISM MALE: ICD-10-CM

## 2024-08-26 RX ORDER — SYRINGE WITH NEEDLE, 1 ML 25GX5/8"
SYRINGE, EMPTY DISPOSABLE MISCELLANEOUS
Qty: 6 EACH | Refills: 5 | Status: SHIPPED | OUTPATIENT
Start: 2024-08-26

## 2024-08-31 NOTE — TELEPHONE ENCOUNTER
Patient aware. He states he feels good. He got his bloodwork the day he was due for his next injection so that would explain the lower end result. No questions or concerns at this time. [id number]

## 2024-11-11 DIAGNOSIS — E29.1 HYPOGONADISM MALE: ICD-10-CM

## 2024-11-11 NOTE — TELEPHONE ENCOUNTER
Medication:   Requested Prescriptions     Pending Prescriptions Disp Refills    SYRINGE-NEEDLE, DISP, 3 ML (B-D 3CC LUER-CARL SYR 23GX1\") 23G X 1\" 3 ML MISC 6 each 5     Sig: USE AS DIRECTED FOR TESTOSTERONE EVERY 14 DAYS        Last Filled:      Patient Phone Number: [phone] (home)     Last appt: 1/25/2024   Next appt: 1/28/2025    Last OARRS:        No data to display

## 2024-11-12 RX ORDER — SYRINGE WITH NEEDLE, 1 ML 25GX5/8"
SYRINGE, EMPTY DISPOSABLE MISCELLANEOUS
Qty: 6 EACH | Refills: 5 | Status: SHIPPED | OUTPATIENT
Start: 2024-11-12

## 2025-01-16 DIAGNOSIS — E23.0 PANHYPOPITUITARISM (HCC): ICD-10-CM

## 2025-01-16 RX ORDER — LEVOTHYROXINE SODIUM 88 UG/1
88 TABLET ORAL DAILY
Qty: 90 TABLET | Refills: 3 | Status: SHIPPED | OUTPATIENT
Start: 2025-01-16

## 2025-01-16 NOTE — TELEPHONE ENCOUNTER
Medication:   Requested Prescriptions     Signed Prescriptions Disp Refills    levothyroxine (SYNTHROID) 88 MCG tablet 90 tablet 3     Sig: TAKE 1 TABLET BY MOUTH DAILY     Authorizing Provider: GURU NIETO     Ordering User: SORIN BACON       Last appt: 1/25/2024   Next appt: 1/28/2025    Last Labs DM: No results found for: \"LABA1C\"

## 2025-01-22 LAB
ALBUMIN: 4.7 G/DL (ref 3.8–4.9)
ALP BLD-CCNC: 100 IU/L (ref 44–121)
ALT SERPL-CCNC: 24 IU/L (ref 0–44)
AST SERPL-CCNC: 19 IU/L (ref 0–40)
BILIRUB SERPL-MCNC: 0.6 MG/DL (ref 0–1.2)
BUN / CREAT RATIO: 17 (ref 9–20)
BUN BLDV-MCNC: 22 MG/DL (ref 6–24)
CALCIUM SERPL-MCNC: 10.1 MG/DL (ref 8.7–10.2)
CHLORIDE BLD-SCNC: 99 MMOL/L (ref 96–106)
CO2: 28 MMOL/L (ref 20–29)
CREAT SERPL-MCNC: 1.3 MG/DL (ref 0.76–1.27)
ESTIMATED GLOMERULAR FILTRATION RATE CREATININE EQUATION: 66 ML/MIN/1.73
GLOBULIN: 2.6 G/DL (ref 1.5–4.5)
GLUCOSE BLD-MCNC: 75 MG/DL (ref 70–99)
HCT VFR BLD CALC: 47.7 % (ref 37.5–51)
HEMOGLOBIN: 16.2 G/DL (ref 13–17.7)
POTASSIUM SERPL-SCNC: 4.8 MMOL/L (ref 3.5–5.2)
PROSTATE SPECIFIC ANTIGEN: 2.2 NG/ML (ref 0–4)
SODIUM BLD-SCNC: 139 MMOL/L (ref 134–144)
T4 FREE: 1.14 NG/DL (ref 0.82–1.77)
TESTOSTERONE FREE-MALE: 10.1 PG/ML (ref 7.2–24)
TESTOSTERONE TOTAL-MALE: 413 NG/DL (ref 264–916)
TOTAL PROTEIN: 7.3 G/DL (ref 6–8.5)

## 2025-01-28 ENCOUNTER — OFFICE VISIT (OUTPATIENT)
Dept: ENDOCRINOLOGY | Age: 55
End: 2025-01-28
Payer: COMMERCIAL

## 2025-01-28 VITALS
HEIGHT: 69 IN | DIASTOLIC BLOOD PRESSURE: 77 MMHG | BODY MASS INDEX: 27.85 KG/M2 | RESPIRATION RATE: 14 BRPM | SYSTOLIC BLOOD PRESSURE: 118 MMHG | TEMPERATURE: 98 F | WEIGHT: 188 LBS | HEART RATE: 83 BPM

## 2025-01-28 DIAGNOSIS — E23.0 HYPOPITUITARISM (HCC): Primary | ICD-10-CM

## 2025-01-28 DIAGNOSIS — E29.1 HYPOGONADISM MALE: ICD-10-CM

## 2025-01-28 DIAGNOSIS — F40.298 NEEDLE PHOBIA: ICD-10-CM

## 2025-01-28 DIAGNOSIS — E04.1 THYROID NODULE: ICD-10-CM

## 2025-01-28 PROCEDURE — 99214 OFFICE O/P EST MOD 30 MIN: CPT | Performed by: INTERNAL MEDICINE

## 2025-01-28 RX ORDER — TESTOSTERONE ENANTHATE 50 MG/.5ML
INJECTION SUBCUTANEOUS
Qty: 12 ADJUSTABLE DOSE PRE-FILLED PEN SYRINGE | Refills: 4 | Status: SHIPPED | OUTPATIENT
Start: 2025-01-28

## 2025-01-28 NOTE — PROGRESS NOTES
hyperprolactinemia   he gets yearly MRI with no significant change in his anatomy , follows with Dr. Ochoa    - 24 hr urine free cortisol normal 20 in dec 2014   --cortisol 10 and ACTH normal range in th 40 range may 2015 No s/s of hypothyroidism   He had a few lower side free thyroid hormones but TSH has been normal which can be normal due to pituitary involovemnt  Will keep an eye for central hypothyroidism   He has no s/s suggestive of hypothyroidism   Check yearly or sooner if pt calls with s/s all of these were discussed with patient in detail     -- Hypogonadism male  On testosterone 100 mg every other weekly PSA was elevated so he was advised to reduce the frequency of testosterone injections from weekly to every other week injections which brought the PSA back from 3 to 2.  And more recently to 1.5 in September 2023  He gets yearly PSA done at his primary care physician and was advised to follow-up with urology as well  Xyosted was not approved    ---Thyroid nodule  Last thyroid ultrasound was in march 2024 which was stable he was advised to continue with  yearly follow-up.  The nodule was reported to be cystic and TR 3 category.  I did an informal thyroid ultrasound today and the subcentimeter isthmus nodule does not need a biopsy currently.  This was discussed with the patient in detail    He saw sin 2019 and was told that no further work-up is needed for thyroid nodule as per patient.    Rt lobe thyroid nodule 1.1 cm stable -- June and dec 2014 done at    June 2015 thyroid uls done at [facility] which shows the size to be 1x 0.9 X 0.9 cm but radiologist reccommended FNA so I discussed the pros and cons of FNAB   He saw  Dr Jaimes and biopsy was not recommended at the time   Gave orders to the patient to have a repeat thyroid ultrasound he will get it done at [facility]      Medulloblastoma s/p craniotomy march 2011 with radiation therapy afterwards    patient was advised by his neurosurgeon

## 2025-02-07 ENCOUNTER — TELEPHONE (OUTPATIENT)
Dept: ENDOCRINOLOGY | Age: 55
End: 2025-02-07

## 2025-02-07 NOTE — TELEPHONE ENCOUNTER
Submitted PA for Xyosted  Via [facility] Key: [id number]   STATUS: Approved today by [employer]  PA Case: [id number], Status: Approved, Coverage Starts on: 2/7/2025 12:00:00 AM, Coverage Ends on: 2/7/2026    If this requires a response please respond to the pool ( P MHCX [facility] MEDICATION PRE-AUTH).      Thank you please advise patient.

## 2025-02-12 ENCOUNTER — TELEPHONE (OUTPATIENT)
Dept: ENDOCRINOLOGY | Age: 55
End: 2025-02-12

## 2025-02-12 NOTE — TELEPHONE ENCOUNTER
Too bad that the Xyosted is not covered well with the insurance, so for now we can switch the testosterone injection to 50 mg every week and please have a testosterone level drawn in the month or 2 months and lets see how you do on that dose

## 2025-02-12 NOTE — TELEPHONE ENCOUNTER
Mack Murillo,     I received a call today from the [facility].  They told me the cost of the Testoserone from them would be $550+ every three months with their discount.  For that cost, we will stay with our current plan (my wife gives me a shot every other week of .5 Ml of Testosterone).     Does it make sense to have her give me a shot weekly (which isn't a big deal and a lot less expensive).  If so what dosage should that be?     ThanksBrice

## 2025-02-13 ENCOUNTER — TELEPHONE (OUTPATIENT)
Dept: ENDOCRINOLOGY | Age: 55
End: 2025-02-13

## 2025-02-13 NOTE — TELEPHONE ENCOUNTER
OK...I will need another prescription since we are doubling the dosage that I currently use (.50ml every other week to .50ml weekly).     Please confirm once this is sent.     Thank you.     Brice

## 2025-02-13 NOTE — TELEPHONE ENCOUNTER
OK...I will need another prescription since we are doubling the dosage that I currently use (.50ml every other week to .50ml weekly).     Please confirm once this is sent.     Thank you.     Brice    Patient requesting testosterone cypionate prescription. Please see conversation in mychart encounter for reference.

## 2025-02-18 ENCOUNTER — TELEPHONE (OUTPATIENT)
Dept: ADMINISTRATIVE | Age: 55
End: 2025-02-18

## 2025-02-18 DIAGNOSIS — E29.1 HYPOGONADISM MALE: ICD-10-CM

## 2025-02-18 DIAGNOSIS — E04.1 THYROID NODULE: ICD-10-CM

## 2025-02-18 RX ORDER — TESTOSTERONE CYPIONATE 200 MG/ML
INJECTION, SOLUTION INTRAMUSCULAR
Qty: 4 ML | Refills: 5 | Status: SHIPPED | OUTPATIENT
Start: 2025-02-18 | End: 2025-08-21

## 2025-02-18 NOTE — TELEPHONE ENCOUNTER
Submitted PA for Testosterone Via [facility] Key: [id number] STATUS: NOT SENT    Rx on file ENDED 1/25/2025.      If this PA is needed, please place an ACTIVE Rx on file and respond to the pool ( P MHCX PSC MEDICATION PRE-AUTH).     Thank you.

## 2025-02-19 NOTE — TELEPHONE ENCOUNTER
Submitted PA for Testosterone Cypionate Via [facility] Key: [id number] STATUS: \"Available without authorization.\"    If this requires a response please respond to the pool ( P MHCX PSC MEDICATION PRE-AUTH).      Thank you please advise patient.

## 2025-02-25 DIAGNOSIS — E04.1 THYROID NODULE: ICD-10-CM

## 2025-02-25 DIAGNOSIS — E29.1 HYPOGONADISM MALE: ICD-10-CM

## 2025-02-26 RX ORDER — TESTOSTERONE CYPIONATE 200 MG/ML
INJECTION, SOLUTION INTRAMUSCULAR
Qty: 4 ML | Refills: 4 | Status: SHIPPED | OUTPATIENT
Start: 2025-02-26 | End: 2026-02-11

## 2025-02-26 NOTE — TELEPHONE ENCOUNTER
Medication:   Requested Prescriptions     Pending Prescriptions Disp Refills    testosterone cypionate (DEPOTESTOTERONE CYPIONATE) 200 MG/ML injection [Pharmacy Med Name: TESTOSTERONE  MG/ML] 4 mL      Sig: INJECT 0.5ML INTRAMUSCULARLY EVERY 14 DAYS (VIALS ARE SINGLE USE)         Last appt: 1/28/2025   Next appt: 01/27/2026      Last PSA:   Lab Results   Component Value Date/Time    PSA 2.2 01/18/2025 11:56 AM     Last Thyroid:   Lab Results   Component Value Date/Time    TSH 1.260 09/26/2019 12:00 AM    T4FREE 1.14 01/18/2025 11:56 AM